# Patient Record
Sex: FEMALE | Race: OTHER | NOT HISPANIC OR LATINO | Employment: UNEMPLOYED | URBAN - METROPOLITAN AREA
[De-identification: names, ages, dates, MRNs, and addresses within clinical notes are randomized per-mention and may not be internally consistent; named-entity substitution may affect disease eponyms.]

---

## 2018-10-26 ENCOUNTER — OFFICE VISIT (OUTPATIENT)
Dept: URGENT CARE | Facility: CLINIC | Age: 28
End: 2018-10-26

## 2018-10-26 VITALS
SYSTOLIC BLOOD PRESSURE: 118 MMHG | OXYGEN SATURATION: 98 % | HEART RATE: 79 BPM | WEIGHT: 141 LBS | DIASTOLIC BLOOD PRESSURE: 72 MMHG | BODY MASS INDEX: 25.95 KG/M2 | TEMPERATURE: 98.1 F | RESPIRATION RATE: 16 BRPM | HEIGHT: 62 IN

## 2018-10-26 DIAGNOSIS — N39.0 URINARY TRACT INFECTION WITHOUT HEMATURIA, SITE UNSPECIFIED: Primary | ICD-10-CM

## 2018-10-26 LAB
SL AMB  POCT GLUCOSE, UA: ABNORMAL
SL AMB LEUKOCYTE ESTERASE,UA: ABNORMAL
SL AMB POCT BILIRUBIN,UA: ABNORMAL
SL AMB POCT BLOOD,UA: ABNORMAL
SL AMB POCT CLARITY,UA: ABNORMAL
SL AMB POCT COLOR,UA: ABNORMAL
SL AMB POCT KETONES,UA: ABNORMAL
SL AMB POCT NITRITE,UA: ABNORMAL
SL AMB POCT PH,UA: 6
SL AMB POCT SPECIFIC GRAVITY,UA: 1
SL AMB POCT URINE HCG: NORMAL
SL AMB POCT URINE PROTEIN: ABNORMAL
SL AMB POCT UROBILINOGEN: 0.2

## 2018-10-26 PROCEDURE — 87086 URINE CULTURE/COLONY COUNT: CPT | Performed by: PHYSICIAN ASSISTANT

## 2018-10-26 PROCEDURE — G0382 LEV 3 HOSP TYPE B ED VISIT: HCPCS | Performed by: PHYSICIAN ASSISTANT

## 2018-10-26 PROCEDURE — 81025 URINE PREGNANCY TEST: CPT | Performed by: PHYSICIAN ASSISTANT

## 2018-10-26 PROCEDURE — 81002 URINALYSIS NONAUTO W/O SCOPE: CPT | Performed by: PHYSICIAN ASSISTANT

## 2018-10-26 RX ORDER — NITROFURANTOIN 25; 75 MG/1; MG/1
100 CAPSULE ORAL 2 TIMES DAILY
Qty: 14 CAPSULE | Refills: 0 | Status: SHIPPED | OUTPATIENT
Start: 2018-10-26 | End: 2018-11-02

## 2018-10-26 NOTE — PROGRESS NOTES
[facility]        NAME: Bindu Oseguera is a 34 y o  female  : 1990    MRN: [id number]  DATE: 2018  TIME: 7:38 PM    Assessment and Plan   Urinary tract infection without hematuria, site unspecified [N39 0]  1  Urinary tract infection without hematuria, site unspecified  Urine culture    nitrofurantoin (MACROBID) 100 mg capsule    POCT urine dip    POCT urine HCG         Patient Instructions     1  Urinary tract infection  -Urine culture is pending- I will call you if your antibiotic needs to be changed  -Take antibiotic as directed  -Increase fluids  -Use tylenol/motrin as needed  -Follow-up with PCP within 5-7 days  If vaginal spotting continues follow-up with OB/GYN    Go to ER with worsening symptoms, high fever, flank pain, vomiting, or any signs of distress    Chief Complaint     Chief Complaint   Patient presents with    Possible UTI     Pt states symptoms started today-urinary frequency and burning    Vaginal Bleeding     states since Monday has noticed spotting on toilet paper after wiping  States she does not have her period currently  Does note that 2 weeks ago she treated herself for a yeast infection with monostat         History of Present Illness       Patient is a 82-year-old female who presents today for evaluation of urinary symptoms that started today  Patient admits to having burning, urinary urgency and frequency  Patient also states that since Monday she has noticed some vaginal spotting as well  Patient states that she took azo prior to arrival   Patient admits to being sexually active however states that they use condoms  She denies chance of pregnancy  Review of Systems   Review of Systems   Constitutional: Negative for chills and fever  Respiratory: Negative for shortness of breath  Cardiovascular: Negative for chest pain  Gastrointestinal: Negative for abdominal pain, nausea and vomiting     Genitourinary: Positive for dysuria, frequency, urgency and vaginal bleeding  Negative for flank pain  Musculoskeletal: Negative for back pain  Current Medications       Current Outpatient Prescriptions:     nitrofurantoin (MACROBID) 100 mg capsule, Take 1 capsule (100 mg total) by mouth 2 (two) times a day for 7 days, Disp: 14 capsule, Rfl: 0    Current Allergies     Allergies as of 10/26/2018    (No Known Allergies)            The following portions of the patient's history were reviewed and updated as appropriate: allergies, current medications, past family history, past medical history, past social history, past surgical history and problem list      History reviewed  No pertinent past medical history  History reviewed  No pertinent surgical history  Family History   Problem Relation Age of Onset    No Known Problems Mother     No Known Problems Father          Medications have been verified  Objective   /72   Pulse 79   Temp 98 1 °F (36 7 °C) (Temporal)   Resp 16   Ht 5' 2" (1 575 m)   Wt 64 kg (141 lb)   LMP 10/10/2018 (Exact Date)   SpO2 98%   BMI 25 79 kg/m²        Physical Exam     Physical Exam   Constitutional: She is oriented to person, place, and time  She appears well-developed and well-nourished  No distress  Cardiovascular: Normal rate, regular rhythm and normal heart sounds  Pulmonary/Chest: Effort normal and breath sounds normal  She has no wheezes  She has no rales  Abdominal: Soft  Bowel sounds are normal  There is no tenderness  There is no CVA tenderness  Neurological: She is alert and oriented to person, place, and time  Skin: Skin is warm and dry  Psychiatric: She has a normal mood and affect  Nursing note and vitals reviewed

## 2018-10-26 NOTE — PATIENT INSTRUCTIONS
1  Urinary tract infection  -Urine culture is pending- I will call you if your antibiotic needs to be changed  -Take antibiotic as directed  -Increase fluids  -Use tylenol/motrin as needed  -Follow-up with PCP within 5-7 days  If vaginal spotting continues follow-up with OB/GYN    Go to ER with worsening symptoms, high fever, flank pain, vomiting, or any signs of distress    Urinary Tract Infection in Women   AMBULATORY CARE:   A urinary tract infection (UTI)  is caused by bacteria that get inside your urinary tract  Most bacteria that enter your urinary tract come out when you urinate  If the bacteria stay in your urinary tract, you may get an infection  Your urinary tract includes your kidneys, ureters, bladder, and urethra  Urine is made in your kidneys, and it flows from the ureters to the bladder  Urine leaves the bladder through the urethra  A UTI is more common in your lower urinary tract, which includes your bladder and urethra  Common symptoms include the following:   · Urinating more often or waking from sleep to urinate    · Pain or burning when you urinate    · Pain or pressure in your lower abdomen     · Urine that smells bad    · Blood in your urine    · Leaking urine  Seek care immediately if:   · You are urinating very little or not at all  · You have a high fever with shaking chills  · You have side or back pain that gets worse  Contact your healthcare provider if:   · You have a fever  · You do not feel better after 2 days of taking antibiotics  · You are vomiting  · You have questions or concerns about your condition or care  Treatment for a UTI  may include medicines to treat a bacterial infection  You may also need medicines to decrease pain and burning, or decrease the urge to urinate often  Prevent a UTI:   · Empty your bladder often  Urinate and empty your bladder as soon as you feel the need  Do not hold your urine for long periods of time      · Wipe from front to back after you urinate or have a bowel movement  This will help prevent germs from getting into your urinary tract through your urethra  · Drink liquids as directed  Ask how much liquid to drink each day and which liquids are best for you  You may need to drink more liquids than usual to help flush out the bacteria  Do not drink alcohol, caffeine, or citrus juices  These can irritate your bladder and increase your symptoms  Your healthcare provider may recommend cranberry juice to help prevent a UTI  · Urinate after you have sex  This can help flush out bacteria passed during sex  · Do not douche or use feminine deodorants  These can change the chemical balance in your vagina  · Change sanitary pads or tampons often  This will help prevent germs from getting into your urinary tract  · Do pelvic muscle exercises often  Pelvic muscle exercises may help you start and stop urinating  Strong pelvic muscles may help you empty your bladder easier  Squeeze these muscles tightly for 5 seconds like you are trying to hold back urine  Then relax for 5 seconds  Gradually work up to squeezing for 10 seconds  Do 3 sets of 15 repetitions a day, or as directed  Follow up with your healthcare provider as directed:  Write down your questions so you remember to ask them during your visits  © 2017 [facility] Information is for End User's use only and may not be sold, redistributed or otherwise used for commercial purposes  All illustrations and images included in CareNotes® are the copyrighted property of [employer]  or Jackson Aquino  The above information is an  only  It is not intended as medical advice for individual conditions or treatments  Talk to your doctor, nurse or pharmacist before following any medical regimen to see if it is safe and effective for you

## 2018-10-27 LAB — BACTERIA UR CULT: NORMAL

## 2018-12-03 ENCOUNTER — OFFICE VISIT (OUTPATIENT)
Dept: URGENT CARE | Facility: CLINIC | Age: 28
End: 2018-12-03

## 2018-12-03 VITALS
TEMPERATURE: 98.6 F | BODY MASS INDEX: 24.66 KG/M2 | HEART RATE: 77 BPM | HEIGHT: 62 IN | OXYGEN SATURATION: 97 % | WEIGHT: 134 LBS | DIASTOLIC BLOOD PRESSURE: 80 MMHG | RESPIRATION RATE: 18 BRPM | SYSTOLIC BLOOD PRESSURE: 120 MMHG

## 2018-12-03 DIAGNOSIS — R30.9 PAINFUL URINATION: Primary | ICD-10-CM

## 2018-12-03 LAB
SL AMB  POCT GLUCOSE, UA: 0
SL AMB LEUKOCYTE ESTERASE,UA: 0
SL AMB POCT BILIRUBIN,UA: 0
SL AMB POCT BLOOD,UA: 0
SL AMB POCT CLARITY,UA: 0
SL AMB POCT COLOR,UA: 0
SL AMB POCT KETONES,UA: 0
SL AMB POCT NITRITE,UA: 0
SL AMB POCT PH,UA: 0
SL AMB POCT SPECIFIC GRAVITY,UA: 0
SL AMB POCT URINE PROTEIN: 0
SL AMB POCT UROBILINOGEN: 0

## 2018-12-03 PROCEDURE — 81002 URINALYSIS NONAUTO W/O SCOPE: CPT | Performed by: PHYSICIAN ASSISTANT

## 2018-12-03 PROCEDURE — 87077 CULTURE AEROBIC IDENTIFY: CPT | Performed by: PHYSICIAN ASSISTANT

## 2018-12-03 PROCEDURE — 87147 CULTURE TYPE IMMUNOLOGIC: CPT | Performed by: PHYSICIAN ASSISTANT

## 2018-12-03 PROCEDURE — G0382 LEV 3 HOSP TYPE B ED VISIT: HCPCS | Performed by: PHYSICIAN ASSISTANT

## 2018-12-03 PROCEDURE — 87086 URINE CULTURE/COLONY COUNT: CPT | Performed by: PHYSICIAN ASSISTANT

## 2018-12-03 RX ORDER — NITROFURANTOIN 25; 75 MG/1; MG/1
100 CAPSULE ORAL 2 TIMES DAILY
Qty: 14 CAPSULE | Refills: 0 | Status: SHIPPED | OUTPATIENT
Start: 2018-12-03 | End: 2018-12-10

## 2018-12-03 NOTE — PATIENT INSTRUCTIONS
Take antibiotic as directed  Eat yogurt to avoid GI upset  Increase fluid intake  Avoid holding bladder for prolonged periods  Urinate after intercourse  Always wipe front to back  Go to the ER for worsening symptoms: flank pain, fever/chills, nausea/vomiting, lower back pain or any other concerning symptoms  Call this in stop us 48 hours for urine culture results

## 2018-12-03 NOTE — PROGRESS NOTES
[facility]        NAME: Hector Merrill is a 34 y o  female  : 1990    MRN: [id number]  DATE: December 3, 2018  TIME: 4:36 PM    Assessment and Plan   Painful urination [R30 9]  1  Painful urination  POCT urine dip    Urine culture    Urine culture    nitrofurantoin (MACROBID) 100 mg capsule         Patient Instructions     Patient Instructions   Take antibiotic as directed  Eat yogurt to avoid GI upset  Increase fluid intake  Avoid holding bladder for prolonged periods  Urinate after intercourse  Always wipe front to back  Go to the ER for worsening symptoms: flank pain, fever/chills, nausea/vomiting, lower back pain or any other concerning symptoms  Call this in [facility] us 48 hours for urine culture results  Follow up with PCP in 3-5 days  Proceed to  ER if symptoms worsen  Chief Complaint     Chief Complaint   Patient presents with    Painful Urination     x24 hrs  Took AZO @ 1200 today         History of Present Illness       This is a 65-year-old female complaining of painful urination times 24 hours  Patient reports past medical history of UTIs  The patient reports lower back and abdominal pain  Patient reports urgency frequency or painful urination  Patient denies any nausea, vomiting, diarrhea, constipation, changes in bowel habits  Review of Systems   Review of Systems   Constitutional: Negative for chills, fatigue and fever  HENT: Negative for congestion, ear pain, hearing loss, postnasal drip, sinus pain, sinus pressure and sore throat  Eyes: Negative for pain and discharge  Respiratory: Negative for chest tightness and shortness of breath  Cardiovascular: Negative for chest pain  Gastrointestinal: Negative for abdominal pain, constipation, nausea and vomiting  Genitourinary: Positive for urgency  Negative for difficulty urinating  Musculoskeletal: Negative for arthralgias and myalgias  Skin: Negative for rash     Neurological: Negative for dizziness and headaches  Psychiatric/Behavioral: Negative for behavioral problems  Current Medications       Current Outpatient Prescriptions:     nitrofurantoin (MACROBID) 100 mg capsule, Take 1 capsule (100 mg total) by mouth 2 (two) times a day for 7 days, Disp: 14 capsule, Rfl: 0    Current Allergies     Allergies as of 12/03/2018    (No Known Allergies)            The following portions of the patient's history were reviewed and updated as appropriate: allergies, current medications, past family history, past medical history, past social history, past surgical history and problem list      No past medical history on file  No past surgical history on file  Family History   Problem Relation Age of Onset    No Known Problems Mother     No Known Problems Father          Medications have been verified  Objective   /80   Pulse 77   Temp 98 6 °F (37 °C) (Temporal)   Resp 18   Ht 5' 2" (1 575 m)   Wt 60 8 kg (134 lb)   SpO2 97%   BMI 24 51 kg/m²        Physical Exam     Physical Exam   Constitutional: She is oriented to person, place, and time  She appears well-developed and well-nourished  Cardiovascular: Normal rate, regular rhythm and normal heart sounds  No murmur heard  Pulmonary/Chest: Effort normal and breath sounds normal  No respiratory distress  Abdominal: Soft  Normal appearance and bowel sounds are normal  She exhibits no distension  There is tenderness in the suprapubic area  There is no rigidity, no rebound, no guarding, no CVA tenderness, no tenderness at McBurney's point and negative Clay's sign  Neurological: She is alert and oriented to person, place, and time  Psychiatric: She has a normal mood and affect  Nursing note and vitals reviewed

## 2018-12-06 LAB — BACTERIA UR CULT: ABNORMAL

## 2023-11-29 ENCOUNTER — OFFICE VISIT (OUTPATIENT)
Dept: URGENT CARE | Facility: CLINIC | Age: 33
End: 2023-11-29
Payer: COMMERCIAL

## 2023-11-29 VITALS
SYSTOLIC BLOOD PRESSURE: 104 MMHG | WEIGHT: 153.38 LBS | BODY MASS INDEX: 28.05 KG/M2 | OXYGEN SATURATION: 98 % | DIASTOLIC BLOOD PRESSURE: 66 MMHG | RESPIRATION RATE: 16 BRPM | HEART RATE: 105 BPM | TEMPERATURE: 97.5 F

## 2023-11-29 DIAGNOSIS — R10.31 RIGHT LOWER QUADRANT ABDOMINAL PAIN: ICD-10-CM

## 2023-11-29 DIAGNOSIS — Z20.818 EXPOSURE TO STREP THROAT: ICD-10-CM

## 2023-11-29 DIAGNOSIS — U07.1 COVID-19: Primary | ICD-10-CM

## 2023-11-29 LAB
SARS-COV-2 AG UPPER RESP QL IA: POSITIVE
SL AMB  POCT GLUCOSE, UA: ABNORMAL
SL AMB LEUKOCYTE ESTERASE,UA: ABNORMAL
SL AMB POCT BILIRUBIN,UA: ABNORMAL
SL AMB POCT BLOOD,UA: ABNORMAL
SL AMB POCT CLARITY,UA: CLEAR
SL AMB POCT COLOR,UA: YELLOW
SL AMB POCT KETONES,UA: ABNORMAL
SL AMB POCT NITRITE,UA: ABNORMAL
SL AMB POCT PH,UA: 6
SL AMB POCT SPECIFIC GRAVITY,UA: 1.03
SL AMB POCT URINE PROTEIN: 100
SL AMB POCT UROBILINOGEN: 0.2
VALID CONTROL: ABNORMAL

## 2023-11-29 PROCEDURE — G0382 LEV 3 HOSP TYPE B ED VISIT: HCPCS | Performed by: PHYSICIAN ASSISTANT

## 2023-11-29 PROCEDURE — 87811 SARS-COV-2 COVID19 W/OPTIC: CPT | Performed by: PHYSICIAN ASSISTANT

## 2023-11-29 PROCEDURE — 81002 URINALYSIS NONAUTO W/O SCOPE: CPT | Performed by: PHYSICIAN ASSISTANT

## 2023-11-29 PROCEDURE — 87086 URINE CULTURE/COLONY COUNT: CPT | Performed by: PHYSICIAN ASSISTANT

## 2023-11-29 RX ORDER — AMOXICILLIN 500 MG/1
500 TABLET, FILM COATED ORAL 2 TIMES DAILY
Qty: 20 TABLET | Refills: 0 | Status: SHIPPED | OUTPATIENT
Start: 2023-11-29 | End: 2023-12-09

## 2023-11-29 NOTE — PROGRESS NOTES
[facility]        NAME: Lindsey Healy is a 34 y.o. female  : 1990    MRN: [id number]  DATE: 2023  TIME: 7:07 PM    Assessment and Plan   COVID-19 [U07.1]  1. COVID-19  Poct Covid 19 Rapid Antigen Test      2. Right lower quadrant abdominal pain  POCT urine dip    Urine culture      3. Exposure to strep throat  amoxicillin (AMOXIL) 500 MG tablet        COVID positive. CDC guidelines provided. 5 days of self isolation from symptom onset with additional 5 days of strict masking. Alternate tylenol/motrin for pain and/or fever. If any symptoms change or worsen proceed to ER  +strep exposure and clinical suspicion for strep, will treat  Urine neg, will send culture     Patient Instructions     There are no Patient Instructions on file for this visit. **Portions of the record may have been created with voice recognition software. Occasional wrong word or "sound a like" substitutions may have occurred due to the inherent limitations of voice recognition software. Read the chart carefully and recognize, using context, where substitutions have occurred. **     Chief Complaint     Chief Complaint   Patient presents with   • Fever     2 days, 104 highest. Runny/stuffy nose, headaches. Coughing. Family members at home have similar sx. Body aches. Pain on right side started today. Taking ibuprofen and tylenol. Pt states she and family have just returned from a road trip. History of Present Illness     Patient presents for evaluation of fever, cough, congestion and sore throat. Symptom onset is 2 days. Patient traveled last week for the holiday to [place]. Her kids are positive for COVID and strep. She c/o RLQ pain that started today. Denies urinary symptoms, SOB, pain on deep inspiration. Review of Systems     Review of Systems   Constitutional:  Positive for chills and fever. Negative for fatigue. HENT:  Positive for sore throat.  Negative for congestion, ear pain, sinus pain and trouble swallowing. Eyes:  Negative for pain, discharge and redness. Respiratory:  Negative for cough, chest tightness, shortness of breath and wheezing. Cardiovascular:  Negative for chest pain, palpitations and leg swelling. Gastrointestinal:  Positive for abdominal pain. Negative for diarrhea, nausea and vomiting. Musculoskeletal:  Negative for arthralgias, joint swelling and myalgias. Skin:  Negative for rash. Neurological:  Negative for dizziness, weakness, numbness and headaches. Current Medications     Current Outpatient Medications:   •  amoxicillin (AMOXIL) 500 MG tablet, Take 1 tablet (500 mg total) by mouth 2 (two) times a day for 10 days, Disp: 20 tablet, Rfl: 0    Current Allergies     Allergies as of 11/29/2023   • (No Known Allergies)            The following portions of the patient's history were reviewed and updated as appropriate: allergies, current medications, past family history, past medical history, past social history, past surgical history and problem list.     Past Medical History:   Diagnosis Date   • Asthma        History reviewed. No pertinent surgical history. Family History   Problem Relation Age of Onset   • No Known Problems Mother    • No Known Problems Father          Medications have been verified. Objective     /66   Pulse 105   Temp 97.5 °F (36.4 °C)   Resp 16   Wt 69.6 kg (153 lb 6 oz)   SpO2 98%   BMI 28.05 kg/m²        Physical Exam     Physical Exam  Vitals and nursing note reviewed. Constitutional:       General: She is not in acute distress. Appearance: Normal appearance. She is well-developed. Cardiovascular:      Rate and Rhythm: Normal rate and regular rhythm. Pulmonary:      Effort: Pulmonary effort is normal.      Breath sounds: Normal breath sounds. Abdominal:      General: Bowel sounds are normal.      Palpations: Abdomen is soft. Abdomen is not rigid. Tenderness:  There is no abdominal tenderness. There is no guarding or rebound. Negative signs include Clay's sign and McBurney's sign. Hernia: No hernia is present. Skin:     General: Skin is warm and dry.

## 2023-11-30 LAB — BACTERIA UR CULT: NORMAL
